# Patient Record
Sex: MALE | Race: BLACK OR AFRICAN AMERICAN | NOT HISPANIC OR LATINO | Employment: FULL TIME | URBAN - METROPOLITAN AREA
[De-identification: names, ages, dates, MRNs, and addresses within clinical notes are randomized per-mention and may not be internally consistent; named-entity substitution may affect disease eponyms.]

---

## 2022-07-02 PROBLEM — Z72.0 TOBACCO ABUSE: Status: ACTIVE | Noted: 2022-07-02

## 2022-07-02 PROBLEM — E87.1 HYPONATREMIA: Status: ACTIVE | Noted: 2022-07-02

## 2022-07-02 PROBLEM — E11.9 NEW ONSET TYPE 2 DIABETES MELLITUS: Status: ACTIVE | Noted: 2022-07-02

## 2022-07-02 PROBLEM — N30.00 ACUTE CYSTITIS WITHOUT HEMATURIA: Status: ACTIVE | Noted: 2022-07-02

## 2022-07-03 PROBLEM — E87.1 HYPONATREMIA: Status: RESOLVED | Noted: 2022-07-02 | Resolved: 2022-07-03

## 2022-07-03 PROBLEM — G45.9 TRANSIENT CEREBRAL ISCHEMIA: Status: ACTIVE | Noted: 2017-08-13

## 2022-07-18 PROBLEM — E78.5 HLD (HYPERLIPIDEMIA): Status: ACTIVE | Noted: 2022-07-18

## 2022-08-17 PROBLEM — Z79.4 TYPE 2 DIABETES MELLITUS WITHOUT COMPLICATION, WITH LONG-TERM CURRENT USE OF INSULIN: Status: ACTIVE | Noted: 2022-07-02

## 2023-02-01 DIAGNOSIS — E11.9 TYPE 2 DIABETES MELLITUS WITHOUT COMPLICATION: ICD-10-CM

## 2023-03-06 PROBLEM — I63.9 ACUTE ISCHEMIC STROKE: Status: ACTIVE | Noted: 2023-03-06

## 2023-03-06 PROBLEM — R06.02 SOB (SHORTNESS OF BREATH): Status: ACTIVE | Noted: 2023-03-06

## 2023-03-07 PROBLEM — I63.81 LACUNAR INFARCT, ACUTE: Status: ACTIVE | Noted: 2023-03-07

## 2023-04-18 ENCOUNTER — TELEPHONE (OUTPATIENT)
Dept: SMOKING CESSATION | Facility: CLINIC | Age: 53
End: 2023-04-18

## 2023-04-19 ENCOUNTER — CLINICAL SUPPORT (OUTPATIENT)
Dept: SMOKING CESSATION | Facility: CLINIC | Age: 53
End: 2023-04-19
Payer: COMMERCIAL

## 2023-04-19 DIAGNOSIS — F17.200 NICOTINE DEPENDENCE: Primary | ICD-10-CM

## 2023-04-19 DIAGNOSIS — I63.9 STROKE: ICD-10-CM

## 2023-04-19 PROCEDURE — 99404 PR PREVENT COUNSEL,INDIV,60 MIN: ICD-10-PCS | Mod: S$GLB,,, | Performed by: GENERAL PRACTICE

## 2023-04-19 PROCEDURE — 99404 PREV MED CNSL INDIV APPRX 60: CPT | Mod: S$GLB,,, | Performed by: GENERAL PRACTICE

## 2023-04-19 RX ORDER — DM/P-EPHED/ACETAMINOPH/DOXYLAM 30-7.5/3
2 LIQUID (ML) ORAL
Qty: 72 LOZENGE | Refills: 0 | Status: SHIPPED | OUTPATIENT
Start: 2023-04-19 | End: 2023-05-24 | Stop reason: SDUPTHER

## 2023-04-19 RX ORDER — IBUPROFEN 200 MG
1 TABLET ORAL DAILY
Qty: 28 PATCH | Refills: 0 | Status: SHIPPED | OUTPATIENT
Start: 2023-04-19 | End: 2023-05-24

## 2023-04-19 NOTE — Clinical Note
Met with patient in clinic to conduct Quit Attempt 1 intake appointment. Patient will be participating in weekly tobacco cessation meetings and will begin the prescribed tobacco cessation medication 14 mg patches and 2 mg nicotine lozenges. FTND of 5 indicates a high level of dependence to tobacco. TATA-D of 3 is perceived as no mental distress/ depression.

## 2023-05-03 ENCOUNTER — TELEPHONE (OUTPATIENT)
Dept: SMOKING CESSATION | Facility: CLINIC | Age: 53
End: 2023-05-03

## 2023-05-03 ENCOUNTER — CLINICAL SUPPORT (OUTPATIENT)
Dept: SMOKING CESSATION | Facility: CLINIC | Age: 53
End: 2023-05-03
Payer: COMMERCIAL

## 2023-05-03 DIAGNOSIS — F17.200 NICOTINE DEPENDENCE: Primary | ICD-10-CM

## 2023-05-03 PROCEDURE — 90853 GROUP PSYCHOTHERAPY: CPT | Mod: S$GLB,,, | Performed by: GENERAL PRACTICE

## 2023-05-03 PROCEDURE — 90853 PR GROUP PSYCHOTHERAPY: ICD-10-PCS | Mod: S$GLB,,, | Performed by: GENERAL PRACTICE

## 2023-05-03 NOTE — PROGRESS NOTES
Individual Follow-Up Form    5/3/2023    Quit Date: 5/1/23    Clinical Status of Patient: Outpatient    Length of Service: 60 minutes    Continuing Medication: yes  Patches or Nicotine Lozenges    Other Medications: none     Target Symptoms: Withdrawal and medication side effects. The following were rated moderate (3) to severe (4) on TCRS:  Moderate (3): none  Severe (4): none    Comments: Spoke with patient and significant other via phone regarding tobacco cessation progress. Patient remains on prescribed tobacco cessation medication regimen of 14 mg patch and 2mg nicotine lozenges PRN without any negative side effects at this time. No refills needed. Patient informed me that he had his last cigarette on 4/30/23. His quit date is 5/1/23. I commended him on quitting. Patient wanted to quit smoking because he is a diabetic, has high blood pressure and wants to save money. Wants a cigarette in the morning once he gets up but uses lozenges and he is fine after that. Discussed learned addiction model, cues/triggers, personal reasons for quitting, medications, goals, quit date. The patient will continue with  therapy sessions and medication monitoring by CTTS. Prescribed medication management will be by physician. The patient denies any abnormal behavioral or mental changes at this time.           Diagnosis: F17.200    Next Visit: 2 weeks

## 2023-05-03 NOTE — Clinical Note
Spoke with patient and significant other via phone regarding tobacco cessation progress. Patient remains on prescribed tobacco cessation medication regimen of 14 mg patch and 2mg nicotine lozenges PRN without any negative side effects at this time. No refills needed. Patient informed me that he had his last cigarette on 4/30/23. His quit date is 5/1/23. I commended him on quitting. Patient wanted to quit smoking because he is a diabetic, has high blood pressure and wants to save money. Wants a cigarette in the morning once he gets up but uses lozenges and he is fine after that. Discussed learned addiction model, cues/triggers, personal reasons for quitting, medications, goals, quit date. The patient will continue with  therapy sessions and medication monitoring by CTTS. Prescribed medication management will be by physician. The patient denies any abnormal behavioral or mental changes at this time.

## 2023-05-24 ENCOUNTER — CLINICAL SUPPORT (OUTPATIENT)
Dept: SMOKING CESSATION | Facility: CLINIC | Age: 53
End: 2023-05-24
Payer: COMMERCIAL

## 2023-05-24 DIAGNOSIS — F17.200 NICOTINE DEPENDENCE: Primary | ICD-10-CM

## 2023-05-24 PROCEDURE — 90853 GROUP PSYCHOTHERAPY: CPT | Mod: S$GLB,,, | Performed by: GENERAL PRACTICE

## 2023-05-24 PROCEDURE — 90853 PR GROUP PSYCHOTHERAPY: ICD-10-PCS | Mod: S$GLB,,, | Performed by: GENERAL PRACTICE

## 2023-05-24 RX ORDER — DM/P-EPHED/ACETAMINOPH/DOXYLAM 30-7.5/3
2 LIQUID (ML) ORAL
Qty: 144 LOZENGE | Refills: 0 | Status: SHIPPED | OUTPATIENT
Start: 2023-05-24

## 2023-05-24 NOTE — PROGRESS NOTES
Individual Follow-Up Form    5/24/2023    Quit Date: 5/1/23    Clinical Status of Patient: Outpatient    Length of Service: 60 minutes    Continuing Medication: yes  Nicotine Lozenges    Other Medications: none     Target Symptoms: Withdrawal and medication side effects. The following were rated moderate (3) to severe (4) on TCRS:  Moderate (3): none  Severe (4): none    Comments: Met with patient and significant other in clinic regarding tobacco cessation progress. Patient has discontinued the patches because he felt as if they were not working and he likes the lozenges better. Refilled lozenges. Patient says that he remains quit as of 5/1/23. I commended him on staying quit. Patient says he is eating more but he is not craving to smoke. Patient does not drink alcohol nor coffee. He stated he is not stressed and don't let things bother him. Reviewed strategies, controlling environment, cues, triggers, caffeine similarities with withdrawal issues of habit and nicotine, alcohol, urges, cravings, stress and relaxation. The patient will continue with  therapy sessions and medication monitoring by CTTS. Prescribed medication management will be by physician. The patient denies any abnormal behavioral or mental changes at this time.    Diagnosis: F17.200    Next Visit: 2 weeks

## 2023-05-24 NOTE — Clinical Note
Met with patient and significant other in clinic regarding tobacco cessation progress. Patient has discontinued the patches because he felt as if they were not working and he likes the lozenges better. Refilled lozenges. Patient says that he remains quit as of 5/1/23. I commended him on staying quit. Patient says he is eating more but he is not craving to smoke. Patient does not drink alcohol nor coffee. He stated he is not stressed and don't let things bother him. Reviewed strategies, controlling environment, cues, triggers, caffeine similarities with withdrawal issues of habit and nicotine, alcohol, urges, cravings, stress and relaxation. The patient will continue with  therapy sessions and medication monitoring by CTTS. Prescribed medication management will be by physician. The patient denies any abnormal behavioral or mental changes at this time.

## 2023-06-05 PROBLEM — I63.9 ACUTE ISCHEMIC STROKE: Status: RESOLVED | Noted: 2023-03-06 | Resolved: 2023-06-05

## 2023-06-12 PROBLEM — G45.9 TIA (TRANSIENT ISCHEMIC ATTACK): Status: RESOLVED | Noted: 2017-08-13 | Resolved: 2023-06-12

## 2023-06-21 ENCOUNTER — CLINICAL SUPPORT (OUTPATIENT)
Dept: SMOKING CESSATION | Facility: CLINIC | Age: 53
End: 2023-06-21
Payer: COMMERCIAL

## 2023-06-21 DIAGNOSIS — F17.200 NICOTINE DEPENDENCE: Primary | ICD-10-CM

## 2023-06-21 PROCEDURE — 90853 GROUP PSYCHOTHERAPY: CPT | Mod: S$GLB,,, | Performed by: GENERAL PRACTICE

## 2023-06-21 PROCEDURE — 90853 PR GROUP PSYCHOTHERAPY: ICD-10-PCS | Mod: S$GLB,,, | Performed by: GENERAL PRACTICE

## 2023-06-21 NOTE — Clinical Note
Met with patient and significant other in clinic regarding tobacco cessation progress. Patient continues to use the 2mg lozenges to aid with his quit. No refills needed at this time. Patient says that he remains quit as of 5/1/23. I commended him on staying quit. Patient says he is eating more but he is not craving to smoke. Patient does not drink alcohol nor coffee. He stated he is not stressed and don't let things bother him. Reviewed strategies, controlling environment, cues, triggers, caffeine similarities with withdrawal issues of habit and nicotine, alcohol, urges, cravings, stress and relaxation. The patient will continue with therapy sessions and medication monitoring by CTTS. Prescribed medication management will be by physician. The patient denies any abnormal behavioral or mental changes at this time.

## 2023-06-21 NOTE — PROGRESS NOTES
Individual Follow-Up Form    6/21/2023    Quit Date: 5/1/23    Clinical Status of Patient: Outpatient    Length of Service: 60 minutes    Continuing Medication: yes  Nicotine Lozenges    Other Medications: none     Target Symptoms: Withdrawal and medication side effects. The following were rated moderate (3) to severe (4) on TCRS:  Moderate (3): none  Severe (4): none    Comments: Met with patient and significant other in clinic regarding tobacco cessation progress. Patient continues to use the 2mg lozenges to aid with his quit. No refills needed at this time. Patient says that he remains quit as of 5/1/23. I commended him on staying quit. Patient says he is eating more but he is not craving to smoke. Patient does not drink alcohol nor coffee. He stated he is not stressed and don't let things bother him. Reviewed strategies, controlling environment, cues, triggers, caffeine similarities with withdrawal issues of habit and nicotine, alcohol, urges, cravings, stress and relaxation. The patient will continue with therapy sessions and medication monitoring by CTTS. Prescribed medication management will be by physician. The patient denies any abnormal behavioral or mental changes at this time.   *Created addendum to change charge from [id number] to [id number]    Diagnosis: F17.200    Next Visit: 2 weeks

## 2023-07-13 ENCOUNTER — CLINICAL SUPPORT (OUTPATIENT)
Dept: SMOKING CESSATION | Facility: CLINIC | Age: 53
End: 2023-07-13
Payer: COMMERCIAL

## 2023-07-13 DIAGNOSIS — F17.200 NICOTINE DEPENDENCE: Primary | ICD-10-CM

## 2023-07-13 PROCEDURE — 99407 BEHAV CHNG SMOKING > 10 MIN: CPT | Mod: S$GLB,,,

## 2023-07-13 PROCEDURE — 99407 PR TOBACCO USE CESSATION INTENSIVE >10 MINUTES: ICD-10-PCS | Mod: S$GLB,,,

## 2023-07-13 PROCEDURE — 99999 PR PBB SHADOW E&M-EST. PATIENT-LVL I: CPT | Mod: PBBFAC,,,

## 2023-07-13 PROCEDURE — 99999 PR PBB SHADOW E&M-EST. PATIENT-LVL I: ICD-10-PCS | Mod: PBBFAC,,,

## 2023-07-13 NOTE — PROGRESS NOTES
Called pt to f/u on his 3 month smoking cessation quit status. Pt stated he remains tobacco free. Congratulated him on his hard work and success. Informed him of benefit period, phone follow ups, and contact information. Will complete smart form and will continue to follow up on quit #1 episode.

## 2023-08-31 ENCOUNTER — TELEPHONE (OUTPATIENT)
Dept: SMOKING CESSATION | Facility: CLINIC | Age: 53
End: 2023-08-31

## 2023-08-31 NOTE — TELEPHONE ENCOUNTER
Called to follow up with patient to see if they were still quit and wanted to get back in the program. He stated that he was still quit and he was good and didn't need the program anymore. I asked him to call me if he began to slip or relapse. He stated he would.

## 2023-10-30 ENCOUNTER — CLINICAL SUPPORT (OUTPATIENT)
Dept: SMOKING CESSATION | Facility: CLINIC | Age: 53
End: 2023-10-30
Payer: COMMERCIAL

## 2023-10-30 DIAGNOSIS — F17.200 NICOTINE DEPENDENCE: Primary | ICD-10-CM

## 2023-10-30 PROCEDURE — 99999 PR PBB SHADOW E&M-EST. PATIENT-LVL I: CPT | Mod: PBBFAC,,,

## 2023-10-30 PROCEDURE — 99999 PR PBB SHADOW E&M-EST. PATIENT-LVL I: ICD-10-PCS | Mod: PBBFAC,,,

## 2023-10-30 PROCEDURE — 99407 PR TOBACCO USE CESSATION INTENSIVE >10 MINUTES: ICD-10-PCS | Mod: S$GLB,,,

## 2023-10-30 PROCEDURE — 99407 BEHAV CHNG SMOKING > 10 MIN: CPT | Mod: S$GLB,,,

## 2023-10-30 NOTE — PROGRESS NOTES
Spoke with patient today in regard to smoking cessation progress for 6-month telephone follow up.  Patient states that he is tobacco free. Commended patient on their quit.  Patient states he works around a lot of smokers which makes it difficult sometimes, but he is doing well.  We discussed healthy snacks for urges.  Informed patient of benefit period, future follow up, and contact information if any further help or support is needed.  Will complete smart form for 6 month follow up on Quit attempt #1.

## 2024-04-24 ENCOUNTER — TELEPHONE (OUTPATIENT)
Dept: SMOKING CESSATION | Facility: CLINIC | Age: 54
End: 2024-04-24